# Patient Record
Sex: FEMALE | Race: WHITE | URBAN - METROPOLITAN AREA
[De-identification: names, ages, dates, MRNs, and addresses within clinical notes are randomized per-mention and may not be internally consistent; named-entity substitution may affect disease eponyms.]

---

## 2022-11-15 ENCOUNTER — OFFICE VISIT (OUTPATIENT)
Dept: URBAN - METROPOLITAN AREA CLINIC 44 | Facility: CLINIC | Age: 77
End: 2022-11-15
Payer: MEDICARE

## 2022-11-15 DIAGNOSIS — H04.123 TEAR FILM INSUFFICIENCY OF BILATERAL LACRIMAL GLANDS: ICD-10-CM

## 2022-11-15 DIAGNOSIS — H25.813 COMBINED FORMS OF AGE-RELATED CATARACT, BILATERAL: Primary | ICD-10-CM

## 2022-11-15 DIAGNOSIS — H43.813 VITREOUS DEGENERATION, BILATERAL: ICD-10-CM

## 2022-11-15 PROCEDURE — 99204 OFFICE O/P NEW MOD 45 MIN: CPT | Performed by: OPTOMETRIST

## 2022-11-15 ASSESSMENT — INTRAOCULAR PRESSURE
OS: 18
OD: 18

## 2022-11-15 ASSESSMENT — VISUAL ACUITY
OS: 20/40
OD: 20/40

## 2022-11-15 ASSESSMENT — KERATOMETRY
OS: 45.13
OD: 44.50

## 2022-11-15 NOTE — IMPRESSION/PLAN
Impression: Combined forms of age-related cataract, bilateral: H25.813. Visually significant/symptomatic cataracts. Patient reporting difficulty with PM vision and/or reading. BCVA OD 20/40, OS 20/40. Glare OD 20/70, OS 20/70. Plan: PLAN: Discussed findings and reviewed treatment options. Rec CE/IOL to improve vision. Patient elects to proceed with surgical treatment. Risks and benefits to be discussed by surgeon. Recommend surgery OU. OD first and then OS. Patient candidate  for the following: Std IOL, toric, multifocal, LenSx, ORA. Refractive goal to be discussed with surgeon. Patient understands they may need correction to achieve best vision.  BCVA maybe limited by DED, floaters